# Patient Record
Sex: MALE | Race: WHITE | Employment: FULL TIME | URBAN - METROPOLITAN AREA
[De-identification: names, ages, dates, MRNs, and addresses within clinical notes are randomized per-mention and may not be internally consistent; named-entity substitution may affect disease eponyms.]

---

## 2019-05-30 ENCOUNTER — APPOINTMENT (OUTPATIENT)
Dept: GENERAL RADIOLOGY | Age: 64
End: 2019-05-30
Payer: COMMERCIAL

## 2019-05-30 ENCOUNTER — HOSPITAL ENCOUNTER (EMERGENCY)
Age: 64
Discharge: HOME | End: 2019-05-30
Attending: SPECIALIST
Payer: COMMERCIAL

## 2019-05-30 VITALS
TEMPERATURE: 98.2 F | RESPIRATION RATE: 16 BRPM | DIASTOLIC BLOOD PRESSURE: 77 MMHG | HEART RATE: 78 BPM | SYSTOLIC BLOOD PRESSURE: 137 MMHG | OXYGEN SATURATION: 96 %

## 2019-05-30 DIAGNOSIS — H81.10 BENIGN PAROXYSMAL POSITIONAL VERTIGO, UNSPECIFIED LATERALITY: Primary | ICD-10-CM

## 2019-05-30 LAB
ABSOLUTE EOS #: 0.2 K/UL (ref 0–0.4)
ABSOLUTE IMMATURE GRANULOCYTE: ABNORMAL K/UL (ref 0–0.3)
ABSOLUTE LYMPH #: 1 K/UL (ref 1–4.8)
ABSOLUTE MONO #: 0.6 K/UL (ref 0.1–1.2)
ANION GAP SERPL CALCULATED.3IONS-SCNC: 9 MMOL/L (ref 9–17)
BASOPHILS # BLD: 1 % (ref 0–2)
BASOPHILS ABSOLUTE: 0 K/UL (ref 0–0.2)
BUN BLDV-MCNC: 17 MG/DL (ref 8–23)
BUN/CREAT BLD: ABNORMAL (ref 9–20)
CALCIUM SERPL-MCNC: 9.2 MG/DL (ref 8.6–10.4)
CHLORIDE BLD-SCNC: 104 MMOL/L (ref 98–107)
CO2: 25 MMOL/L (ref 20–31)
CREAT SERPL-MCNC: 0.79 MG/DL (ref 0.7–1.2)
DIFFERENTIAL TYPE: ABNORMAL
EOSINOPHILS RELATIVE PERCENT: 3 % (ref 1–4)
GFR AFRICAN AMERICAN: >60 ML/MIN
GFR NON-AFRICAN AMERICAN: >60 ML/MIN
GFR SERPL CREATININE-BSD FRML MDRD: ABNORMAL ML/MIN/{1.73_M2}
GFR SERPL CREATININE-BSD FRML MDRD: ABNORMAL ML/MIN/{1.73_M2}
GLUCOSE BLD-MCNC: 124 MG/DL (ref 75–110)
GLUCOSE BLD-MCNC: 126 MG/DL (ref 70–99)
HCT VFR BLD CALC: 45 % (ref 41–53)
HEMOGLOBIN: 15.4 G/DL (ref 13.5–17.5)
IMMATURE GRANULOCYTES: ABNORMAL %
LYMPHOCYTES # BLD: 15 % (ref 24–44)
MCH RBC QN AUTO: 30.2 PG (ref 26–34)
MCHC RBC AUTO-ENTMCNC: 34.2 G/DL (ref 31–37)
MCV RBC AUTO: 88.1 FL (ref 80–100)
MONOCYTES # BLD: 9 % (ref 2–11)
NRBC AUTOMATED: ABNORMAL PER 100 WBC
PDW BLD-RTO: 13.5 % (ref 12.5–15.4)
PLATELET # BLD: 210 K/UL (ref 140–450)
PLATELET ESTIMATE: ABNORMAL
PMV BLD AUTO: 8 FL (ref 6–12)
POTASSIUM SERPL-SCNC: 4.6 MMOL/L (ref 3.7–5.3)
RBC # BLD: 5.11 M/UL (ref 4.5–5.9)
RBC # BLD: ABNORMAL 10*6/UL
SEG NEUTROPHILS: 72 % (ref 36–66)
SEGMENTED NEUTROPHILS ABSOLUTE COUNT: 5 K/UL (ref 1.8–7.7)
SODIUM BLD-SCNC: 138 MMOL/L (ref 135–144)
TROPONIN INTERP: NORMAL
TROPONIN T: NORMAL NG/ML
TROPONIN, HIGH SENSITIVITY: 8 NG/L (ref 0–22)
WBC # BLD: 6.9 K/UL (ref 3.5–11)
WBC # BLD: ABNORMAL 10*3/UL

## 2019-05-30 PROCEDURE — 82947 ASSAY GLUCOSE BLOOD QUANT: CPT

## 2019-05-30 PROCEDURE — 96374 THER/PROPH/DIAG INJ IV PUSH: CPT

## 2019-05-30 PROCEDURE — 36415 COLL VENOUS BLD VENIPUNCTURE: CPT

## 2019-05-30 PROCEDURE — 80048 BASIC METABOLIC PNL TOTAL CA: CPT

## 2019-05-30 PROCEDURE — 6360000002 HC RX W HCPCS: Performed by: PHYSICIAN ASSISTANT

## 2019-05-30 PROCEDURE — 71046 X-RAY EXAM CHEST 2 VIEWS: CPT

## 2019-05-30 PROCEDURE — 84484 ASSAY OF TROPONIN QUANT: CPT

## 2019-05-30 PROCEDURE — 99284 EMERGENCY DEPT VISIT MOD MDM: CPT

## 2019-05-30 PROCEDURE — 93005 ELECTROCARDIOGRAM TRACING: CPT

## 2019-05-30 PROCEDURE — 6370000000 HC RX 637 (ALT 250 FOR IP): Performed by: PHYSICIAN ASSISTANT

## 2019-05-30 PROCEDURE — 2580000003 HC RX 258: Performed by: PHYSICIAN ASSISTANT

## 2019-05-30 PROCEDURE — 85025 COMPLETE CBC W/AUTO DIFF WBC: CPT

## 2019-05-30 RX ORDER — MECLIZINE HCL 12.5 MG/1
12.5-25 TABLET ORAL 3 TIMES DAILY PRN
Qty: 20 TABLET | Refills: 0 | Status: SHIPPED | OUTPATIENT
Start: 2019-05-30 | End: 2019-06-06

## 2019-05-30 RX ORDER — ONDANSETRON 2 MG/ML
4 INJECTION INTRAMUSCULAR; INTRAVENOUS ONCE
Status: COMPLETED | OUTPATIENT
Start: 2019-05-30 | End: 2019-05-30

## 2019-05-30 RX ORDER — MECLIZINE HCL 12.5 MG/1
25 TABLET ORAL ONCE
Status: COMPLETED | OUTPATIENT
Start: 2019-05-30 | End: 2019-05-30

## 2019-05-30 RX ORDER — 0.9 % SODIUM CHLORIDE 0.9 %
1000 INTRAVENOUS SOLUTION INTRAVENOUS ONCE
Status: COMPLETED | OUTPATIENT
Start: 2019-05-30 | End: 2019-05-30

## 2019-05-30 RX ORDER — ONDANSETRON 4 MG/1
4 TABLET, FILM COATED ORAL EVERY 4 HOURS PRN
Qty: 20 TABLET | Refills: 0 | Status: SHIPPED | OUTPATIENT
Start: 2019-05-30

## 2019-05-30 RX ADMIN — ONDANSETRON 4 MG: 2 INJECTION INTRAMUSCULAR; INTRAVENOUS at 11:53

## 2019-05-30 RX ADMIN — SODIUM CHLORIDE 1000 ML: 9 INJECTION, SOLUTION INTRAVENOUS at 11:34

## 2019-05-30 RX ADMIN — MECLIZINE 25 MG: 12.5 TABLET ORAL at 11:53

## 2019-05-30 NOTE — ED NOTES
PT ambulated to room 12. C/o dizziness since early this am. Pt reports he woke this am went from laying to standing position and reports dizziness with position change. Pt reports he laid back down and went to bed. Reports woke this am with same s/s walked to get water and reports he felt very unsteady on feet. Pt sts did drink some water, about 3 glasses of water and some symptoms have improved. Pt reports still slight dizziness but nothing to the extend of this am. Pt denies any chest pain or SOB. Reports urine looking very dark yesterday, accompanied with slight nausea today denies any diarrhea and vomiting. Pt alert and oriented speaking sentences no distress noted. Pt able to ambulate and transfer independently gait slow and steady. Pt PERRL smile symmetrical tongue midline, hand grasp equal bilat, arm/leg strength strong and equal bialt.       Jame Arrieta RN  05/30/19 2096
Patient advised no driving with dizziness. Patient verbalized understanding and has ride home at bedside. Patient cleared for discharge per MD. Patient discharge instructions explained, Rx given and explained to patient. Patient Verbalized understanding of all instructions and all patient questions answered to their satisfaction. Patient departs from ED in stable condition.         Ezra Coto RN  05/30/19 1295
5

## 2019-05-30 NOTE — ED PROVIDER NOTES
[facility] ED  [address] [address]  Phone: [phone]  Fax: [phone]      eMERGENCY dEPARTMENT eNCOUnter      Pt Name: Graciela Anderson  MRN: [id number]  [place] 1955  Date of evaluation: 5/30/19      CHIEF COMPLAINT:  Chief Complaint   Patient presents with    Dizziness       HISTORY OF PRESENT ILLNESS    Graciela Anderson is a 70 y.o. male who presents with evaluation for DIZZINESS:       Context/Timing:  Patient here for evaluation of positional vertigo that he noted the first time earlier this morning. Patient is a  that was sleeping in his truck at a local terminal.patient states he got up to the bathroom and felt a spinning sensation. He states he ended up going back to bed after this. Patient's states and when he woke up again this morning he had the same sensation. He denies having this at rest and 8 sinuses only associated with position change. He denies any recent ear pain/URI symptoms last chest pain/palpitations/respiratory symptoms/leg edema or leg pain. He denies any previous history of blood clots or strokes. He does admit to 1 remote episode of global transient amnesia. He denies any focal weakness or numbness. Patient states that he's been otherwise feeling fine. He is presently from [place] and is a  moving through the area. He denies any cardiac history. Hx dizziness? NO  Recent URI? NO  Ear pain? NO  Fall? NO  Head injury? NO  LOC? NO  Headache? NO  Vision changes? NO  TIA or CVA hx? As above  Abd pain? NO  Nausea? NO  Vomiting? NO  Neck/Back pain? NO  Paresthesias? NO  Chest pain? NO  Palpitations? NO  Cardiac hx? NO      Recent med adjustments/changes? NO    Quality:  Room spinning  Duration:   intermittent  Modifying Factors: With position change, better at rest.  Severity:   mild-to-moderate    Nursing Notes were reviewed.   REVIEW OF SYSTEMS WITH AUTO DIFFERENTIAL - Abnormal; Notable for the following components:       Result Value    Seg Neutrophils 72 (*)     Lymphocytes 15 (*)     All other components within normal limits   BASIC METABOLIC PANEL - Abnormal; Notable for the following components:    Glucose 126 (*)     All other components within normal limits   POC GLUCOSE FINGERSTICK - Abnormal; Notable for the following components:    POC Glucose 124 (*)     All other components within normal limits   TROPONIN         EMERGENCY DEPARTMENT COURSE:     1110  Orthostatics wnl. PE benign. No deficits on exam.  One set cardiac workup and IVF ordering. Could be some mild dehydration. 1357  Patient up ambulating to bathroom unassisted and without complication. Patient states he feels safe going back to his hotel. 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 I was discussing patient's information with his nurse krissy on speaker phone as well, which we will he was agreeable to. Will be providing patient with additional meclizine as well as Zofran at discharge. Patient is return to the emergency department for any worsening of systems any other/neurological concerns. I have reviewed the disposition diagnosis with the patient and or their family/guardian. I have answered their questions and given discharge instructions. They voiced understanding of these instructions and did not have any further questions or complaints.       Orders Placed This Encounter   Medications    0.9 % sodium chloride bolus    meclizine (ANTIVERT) tablet 25 mg    ondansetron (ZOFRAN) injection 4 mg    meclizine (ANTIVERT) 12.5 MG tablet     Sig: Take 1-2 tablets by mouth 3 times daily as needed for Dizziness or Nausea     Dispense:  20 tablet     Refill:  0    ondansetron (ZOFRAN) 4 MG tablet     Sig: Take 1 tablet by mouth every 4 hours as needed for Nausea     Dispense:  20 tablet     Refill:  0 CONSULTS:  None      FINAL IMPRESSION      1. Benign paroxysmal positional vertigo, unspecified laterality          DISPOSITION/PLAN:  DISPOSITION          PATIENT REFERRED TO:  [facility]  [address]   [place] [address] [phone]  Go to   for worsening of your symptoms      DISCHARGE MEDICATIONS:  Discharge Medication List as of 5/30/2019  2:00 PM      START taking these medications    Details   meclizine (ANTIVERT) 12.5 MG tablet Take 1-2 tablets by mouth 3 times daily as needed for Dizziness or Nausea, Disp-20 tablet, R-0Print      ondansetron (ZOFRAN) 4 MG tablet Take 1 tablet by mouth every 4 hours as needed for Nausea, Disp-20 tablet, R-0Print             (Please note that portions of this note were completed with a voice recognition program.  Efforts were made to edit the dictations but occasionally words are mis-transcribed.)    JIM Wiseman PA-C  06/01/19 1105

## 2019-05-31 LAB
EKG ATRIAL RATE: 85 BPM
EKG P AXIS: 47 DEGREES
EKG P-R INTERVAL: 232 MS
EKG Q-T INTERVAL: 408 MS
EKG QRS DURATION: 106 MS
EKG QTC CALCULATION (BAZETT): 485 MS
EKG R AXIS: 60 DEGREES
EKG T AXIS: 40 DEGREES
EKG VENTRICULAR RATE: 85 BPM

## 2019-05-31 NOTE — ED PROVIDER NOTES
Emergency Department     Faculty Attestation    I performed a history and physical examination of the patient and discussed management with the mid level provideer. I reviewed the mid level provider's note and agree with the documented findings and plan of care. Any areas of disagreement are noted on the chart. I was personally present for the key portions of any procedures. I have documented in the chart those procedures where I was not present during the key portions. I have reviewed the emergency nurses triage note. I agree with the chief complaint, past medical history, past surgical history, allergies, medications, social and family history as documented unless otherwise noted below. Documentation of the HPI, Physical Exam and Medical Decision Making performed by medical students or scribes is based on my personal performance of the HPI, PE and MDM. For Physician Assistant/ Nurse Practitioner cases/documentation I have personally evaluated this patient and have completed at least one if not all key elements of the E/M (history, physical exam, and MDM). Additional findings are as noted. Primary Care Physician:  No primary care provider on file.     CHIEF COMPLAINT       Chief Complaint   Patient presents with    Dizziness       RECENT VITALS:   Temp: 98.2 °F (36.8 °C),  Pulse: 78, Resp: 16, BP: 137/77    LABS:  Labs Reviewed   CBC WITH AUTO DIFFERENTIAL - Abnormal; Notable for the following components:       Result Value    Seg Neutrophils 72 (*)     Lymphocytes 15 (*)     All other components within normal limits   BASIC METABOLIC PANEL - Abnormal; Notable for the following components:    Glucose 126 (*)     All other components within normal limits   POC GLUCOSE FINGERSTICK - Abnormal; Notable for the following components:    POC Glucose 124 (*)     All other components within normal limits   TROPONIN         PERTINENT ATTENDING PHYSICIAN COMMENTS:    25-year-old male patient presents to the emergency department complaining of sudden onset of the dizziness associated with the nausea since 1 a.m. prior to arrival.  Patient admits to vertigo which is worse with the movements and better while resting and laying down. He denies any headache, visual disturbances, chest pain, shortness of breath, palpitations or diaphoresis. He also denies any abdominal pain, vomiting or diarrhea. He denies any URI symptoms. Patient is afebrile and vital signs are stable. His lungs are clear to auscultation. Rhythm is regular without murmurs. Abdomen is soft and nontender with active bowel sounds. Extremities without any edema or tenderness. Neurologically no focal deficits. Gait is steady and the Romberg test is negative. Cerebellar function tests are normal.  His diagnostic workup including cardiac markers and chest x-ray are unremarkable. EKG reveals normal sinus rhythm with first-degree AV block, normal intervals with no acute ST-T wave changes. Patient is feeling much better during ED stay and remains neurologically intact. His gait is steady. Patient was discharged home on meclizine and the Zofran as needed, follow up with PCP, return if worse.           Queenie Shone, MD  05/31/19 9871